# Patient Record
Sex: FEMALE | Race: WHITE | NOT HISPANIC OR LATINO | URBAN - METROPOLITAN AREA
[De-identification: names, ages, dates, MRNs, and addresses within clinical notes are randomized per-mention and may not be internally consistent; named-entity substitution may affect disease eponyms.]

---

## 2023-03-31 ENCOUNTER — OFFICE VISIT (OUTPATIENT)
Dept: URBAN - METROPOLITAN AREA CLINIC 17 | Facility: CLINIC | Age: 28
End: 2023-03-31
Payer: COMMERCIAL

## 2023-03-31 DIAGNOSIS — H30.893 OTHER CHORIORETINAL INFLAMMATIONS, BILATERAL: Primary | ICD-10-CM

## 2023-03-31 PROCEDURE — 99204 OFFICE O/P NEW MOD 45 MIN: CPT

## 2023-03-31 PROCEDURE — 92133 CPTRZD OPH DX IMG PST SGM ON: CPT

## 2023-03-31 PROCEDURE — 92250 FUNDUS PHOTOGRAPHY W/I&R: CPT

## 2023-03-31 ASSESSMENT — INTRAOCULAR PRESSURE
OD: 20
OS: 20

## 2023-03-31 NOTE — IMPRESSION/PLAN
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
-Optos showed scarring OS
-patient experiences pain OD
-MRI in 2019 (inconclusive) -RNFL OCT showed swelling of optic nerves OD, OS; stable to scans in 2017
-anterior chamber is deep and quiet. Plan: Patient educated on findings. Recommend patient to follow up with PCP/neurology for further bloodwork including for possible infections and inflammation as well as updated MRI scans of head and spine. Return to clinic if patient notes any changes in vision or worsening of pain. Continue to monitor.